# Patient Record
Sex: MALE | Race: BLACK OR AFRICAN AMERICAN | Employment: OTHER | URBAN - METROPOLITAN AREA
[De-identification: names, ages, dates, MRNs, and addresses within clinical notes are randomized per-mention and may not be internally consistent; named-entity substitution may affect disease eponyms.]

---

## 2017-10-14 LAB — PROSTATE SPECIFIC ANTIGEN: 1.81 NG/ML (ref 0–4)

## 2021-08-24 ENCOUNTER — ANESTHESIA (OUTPATIENT)
Dept: ENDOSCOPY | Age: 81
End: 2021-08-24
Payer: MEDICARE

## 2021-08-24 ENCOUNTER — ANESTHESIA EVENT (OUTPATIENT)
Dept: ENDOSCOPY | Age: 81
End: 2021-08-24
Payer: MEDICARE

## 2021-08-24 ENCOUNTER — HOSPITAL ENCOUNTER (OUTPATIENT)
Age: 81
Setting detail: OUTPATIENT SURGERY
Discharge: HOME | End: 2021-08-24
Attending: INTERNAL MEDICINE | Admitting: INTERNAL MEDICINE
Payer: MEDICARE

## 2021-08-24 VITALS
OXYGEN SATURATION: 96 % | RESPIRATION RATE: 18 BRPM | WEIGHT: 136 LBS | TEMPERATURE: 96.5 F | HEIGHT: 66 IN | HEART RATE: 82 BPM | DIASTOLIC BLOOD PRESSURE: 48 MMHG | SYSTOLIC BLOOD PRESSURE: 94 MMHG | BODY MASS INDEX: 21.86 KG/M2

## 2021-08-24 VITALS — SYSTOLIC BLOOD PRESSURE: 90 MMHG | OXYGEN SATURATION: 100 % | DIASTOLIC BLOOD PRESSURE: 51 MMHG

## 2021-08-24 LAB
GLUCOSE BLD-MCNC: 116 MG/DL (ref 70–99)
PERFORMED ON: ABNORMAL

## 2021-08-24 PROCEDURE — 88305 TISSUE EXAM BY PATHOLOGIST: CPT

## 2021-08-24 PROCEDURE — 6360000002 HC RX W HCPCS: Performed by: NURSE ANESTHETIST, CERTIFIED REGISTERED

## 2021-08-24 PROCEDURE — 2500000003 HC RX 250 WO HCPCS: Performed by: NURSE ANESTHETIST, CERTIFIED REGISTERED

## 2021-08-24 PROCEDURE — 3700000000 HC ANESTHESIA ATTENDED CARE: Performed by: INTERNAL MEDICINE

## 2021-08-24 PROCEDURE — 2580000003 HC RX 258: Performed by: INTERNAL MEDICINE

## 2021-08-24 PROCEDURE — 3609010600 HC COLONOSCOPY POLYPECTOMY SNARE/COLD BIOPSY: Performed by: INTERNAL MEDICINE

## 2021-08-24 PROCEDURE — 2709999900 HC NON-CHARGEABLE SUPPLY: Performed by: INTERNAL MEDICINE

## 2021-08-24 PROCEDURE — 7100000011 HC PHASE II RECOVERY - ADDTL 15 MIN: Performed by: INTERNAL MEDICINE

## 2021-08-24 PROCEDURE — 3700000001 HC ADD 15 MINUTES (ANESTHESIA): Performed by: INTERNAL MEDICINE

## 2021-08-24 PROCEDURE — 7100000010 HC PHASE II RECOVERY - FIRST 15 MIN: Performed by: INTERNAL MEDICINE

## 2021-08-24 RX ORDER — SODIUM CHLORIDE, SODIUM LACTATE, POTASSIUM CHLORIDE, CALCIUM CHLORIDE 600; 310; 30; 20 MG/100ML; MG/100ML; MG/100ML; MG/100ML
INJECTION, SOLUTION INTRAVENOUS CONTINUOUS
Status: DISCONTINUED | OUTPATIENT
Start: 2021-08-24 | End: 2021-08-24 | Stop reason: HOSPADM

## 2021-08-24 RX ORDER — SODIUM CHLORIDE 9 MG/ML
INJECTION, SOLUTION INTRAVENOUS CONTINUOUS
Status: DISCONTINUED | OUTPATIENT
Start: 2021-08-24 | End: 2021-08-24 | Stop reason: HOSPADM

## 2021-08-24 RX ORDER — ASCORBIC ACID 500 MG
500 TABLET ORAL
COMMUNITY

## 2021-08-24 RX ORDER — ATORVASTATIN CALCIUM 40 MG/1
1 TABLET, FILM COATED ORAL EVERY EVENING
COMMUNITY
Start: 2021-06-20

## 2021-08-24 RX ORDER — CLOTRIMAZOLE AND BETAMETHASONE DIPROPIONATE 10; .64 MG/G; MG/G
CREAM TOPICAL NIGHTLY
COMMUNITY
Start: 2021-06-16

## 2021-08-24 RX ORDER — PROPOFOL 10 MG/ML
INJECTION, EMULSION INTRAVENOUS CONTINUOUS PRN
Status: DISCONTINUED | OUTPATIENT
Start: 2021-08-24 | End: 2021-08-24 | Stop reason: SDUPTHER

## 2021-08-24 RX ORDER — DICYCLOMINE HCL 20 MG
1 TABLET ORAL 3 TIMES DAILY
COMMUNITY
Start: 2021-06-30

## 2021-08-24 RX ORDER — MIDODRINE HYDROCHLORIDE 5 MG/1
TABLET ORAL
COMMUNITY
Start: 2020-10-21

## 2021-08-24 RX ORDER — CHLORAL HYDRATE 500 MG
1 CAPSULE ORAL DAILY
Status: ON HOLD | COMMUNITY
Start: 2021-07-08 | End: 2021-08-24

## 2021-08-24 RX ORDER — LIDOCAINE HYDROCHLORIDE 20 MG/ML
INJECTION, SOLUTION INFILTRATION; PERINEURAL PRN
Status: DISCONTINUED | OUTPATIENT
Start: 2021-08-24 | End: 2021-08-24 | Stop reason: SDUPTHER

## 2021-08-24 RX ORDER — PSEUDOEPHEDRINE HCL 30 MG
100 TABLET ORAL NIGHTLY
COMMUNITY

## 2021-08-24 RX ORDER — OMEGA-3-ACID ETHYL ESTERS 1 G/1
4 CAPSULE, LIQUID FILLED ORAL DAILY
COMMUNITY

## 2021-08-24 RX ORDER — INSULIN GLARGINE 100 [IU]/ML
15 INJECTION, SOLUTION SUBCUTANEOUS EVERY EVENING
COMMUNITY
Start: 2021-07-04

## 2021-08-24 RX ORDER — LIDOCAINE AND PRILOCAINE 25; 25 MG/G; MG/G
CREAM TOPICAL
COMMUNITY

## 2021-08-24 RX ORDER — GUAIFENESIN 600 MG/1
1200 TABLET, EXTENDED RELEASE ORAL 2 TIMES DAILY PRN
COMMUNITY

## 2021-08-24 RX ORDER — ISOSORBIDE MONONITRATE 30 MG/1
1 TABLET, EXTENDED RELEASE ORAL EVERY OTHER DAY
COMMUNITY

## 2021-08-24 RX ORDER — INSULIN ASPART 100 [IU]/ML
INJECTION, SOLUTION INTRAVENOUS; SUBCUTANEOUS
COMMUNITY
Start: 2021-06-20

## 2021-08-24 RX ORDER — CLOPIDOGREL BISULFATE 75 MG/1
75 TABLET ORAL DAILY
COMMUNITY
Start: 2021-05-28

## 2021-08-24 RX ORDER — GABAPENTIN 100 MG/1
1 CAPSULE ORAL EVERY OTHER DAY
COMMUNITY
Start: 2021-08-16

## 2021-08-24 RX ORDER — PROPOFOL 10 MG/ML
INJECTION, EMULSION INTRAVENOUS PRN
Status: DISCONTINUED | OUTPATIENT
Start: 2021-08-24 | End: 2021-08-24 | Stop reason: SDUPTHER

## 2021-08-24 RX ORDER — ASPIRIN 81 MG/1
81 TABLET ORAL DAILY
COMMUNITY

## 2021-08-24 RX ORDER — ALBUTEROL SULFATE 90 UG/1
AEROSOL, METERED RESPIRATORY (INHALATION)
COMMUNITY
Start: 2021-07-20

## 2021-08-24 RX ORDER — GLYCOPYRROLATE 0.2 MG/ML
INJECTION INTRAMUSCULAR; INTRAVENOUS PRN
Status: DISCONTINUED | OUTPATIENT
Start: 2021-08-24 | End: 2021-08-24 | Stop reason: SDUPTHER

## 2021-08-24 RX ORDER — EPHEDRINE SULFATE 50 MG/ML
INJECTION INTRAVENOUS PRN
Status: DISCONTINUED | OUTPATIENT
Start: 2021-08-24 | End: 2021-08-24 | Stop reason: SDUPTHER

## 2021-08-24 RX ORDER — FEBUXOSTAT 40 MG/1
1 TABLET, FILM COATED ORAL DAILY
COMMUNITY
Start: 2021-08-16

## 2021-08-24 RX ADMIN — LIDOCAINE HYDROCHLORIDE 50 MG: 20 INJECTION, SOLUTION INFILTRATION; PERINEURAL at 13:12

## 2021-08-24 RX ADMIN — SODIUM CHLORIDE: 9 INJECTION, SOLUTION INTRAVENOUS at 12:40

## 2021-08-24 RX ADMIN — PHENYLEPHRINE HYDROCHLORIDE 160 MCG: 10 INJECTION, SOLUTION INTRAMUSCULAR; INTRAVENOUS; SUBCUTANEOUS at 13:34

## 2021-08-24 RX ADMIN — PHENYLEPHRINE HYDROCHLORIDE 80 MCG: 10 INJECTION, SOLUTION INTRAMUSCULAR; INTRAVENOUS; SUBCUTANEOUS at 13:22

## 2021-08-24 RX ADMIN — PROPOFOL 100 MCG/KG/MIN: 10 INJECTION, EMULSION INTRAVENOUS at 13:12

## 2021-08-24 RX ADMIN — EPHEDRINE SULFATE 10 MG: 50 INJECTION INTRAVENOUS at 13:53

## 2021-08-24 RX ADMIN — PHENYLEPHRINE HYDROCHLORIDE 240 MCG: 10 INJECTION, SOLUTION INTRAMUSCULAR; INTRAVENOUS; SUBCUTANEOUS at 13:38

## 2021-08-24 RX ADMIN — PHENYLEPHRINE HYDROCHLORIDE 160 MCG: 10 INJECTION, SOLUTION INTRAMUSCULAR; INTRAVENOUS; SUBCUTANEOUS at 13:28

## 2021-08-24 RX ADMIN — PHENYLEPHRINE HYDROCHLORIDE 240 MCG: 10 INJECTION, SOLUTION INTRAMUSCULAR; INTRAVENOUS; SUBCUTANEOUS at 13:44

## 2021-08-24 RX ADMIN — EPHEDRINE SULFATE 10 MG: 50 INJECTION INTRAVENOUS at 13:56

## 2021-08-24 RX ADMIN — SODIUM CHLORIDE: 9 INJECTION, SOLUTION INTRAVENOUS at 12:53

## 2021-08-24 RX ADMIN — PROPOFOL 70 MG: 10 INJECTION, EMULSION INTRAVENOUS at 13:12

## 2021-08-24 RX ADMIN — EPHEDRINE SULFATE 5 MG: 50 INJECTION INTRAVENOUS at 13:50

## 2021-08-24 RX ADMIN — GLYCOPYRROLATE 0.1 MG: 0.2 INJECTION, SOLUTION INTRAMUSCULAR; INTRAVENOUS at 13:24

## 2021-08-24 RX ADMIN — PHENYLEPHRINE HYDROCHLORIDE 80 MCG: 10 INJECTION, SOLUTION INTRAMUSCULAR; INTRAVENOUS; SUBCUTANEOUS at 13:18

## 2021-08-24 ASSESSMENT — PULMONARY FUNCTION TESTS
PIF_VALUE: 1
PIF_VALUE: 0
PIF_VALUE: 1
PIF_VALUE: 0
PIF_VALUE: 1
PIF_VALUE: 0
PIF_VALUE: 1
PIF_VALUE: 0
PIF_VALUE: 1

## 2021-08-24 ASSESSMENT — PAIN SCALES - GENERAL: PAINLEVEL_OUTOF10: 0

## 2021-08-24 ASSESSMENT — PAIN - FUNCTIONAL ASSESSMENT: PAIN_FUNCTIONAL_ASSESSMENT: 0-10

## 2021-08-24 ASSESSMENT — LIFESTYLE VARIABLES: SMOKING_STATUS: 0

## 2021-08-24 NOTE — ANESTHESIA POSTPROCEDURE EVALUATION
Department of Anesthesiology  Postprocedure Note    Patient: Sariah Hurtado  MRN: [de-identified]  YOB: 1940  Date of evaluation: 8/24/2021  Time:  4:12 PM     Procedure Summary     Date: 08/24/21 Room / Location: [facility]    Anesthesia Start: 0167 Anesthesia Stop: 1400    Procedure: COLONOSCOPY POLYPECTOMY SNARE/COLD BIOPSY (Left ) Diagnosis: (HX COLON POLYPS, FAMILY HX COLON CNACER)    Surgeons: Helga Savage MD Responsible Provider: Jennifer Mendoza MD    Anesthesia Type: general, MAC ASA Status: 3          Anesthesia Type: general, MAC    Yamilet Phase I: Yamilet Score: 10    Yamilet Phase II: Yamilet Score: 10    Last vitals: Reviewed and per EMR flowsheets.        Anesthesia Post Evaluation    Patient location during evaluation: PACU  Patient participation: complete - patient participated  Level of consciousness: awake  Pain score: 2  Airway patency: patent  Nausea & Vomiting: no nausea and no vomiting  Complications: no  Cardiovascular status: hemodynamically stable  Respiratory status: acceptable  Hydration status: euvolemic

## 2021-08-24 NOTE — ANESTHESIA PRE PROCEDURE
Department of Anesthesiology  Preprocedure Note       Name:  José Luis Shine   Age:  79 y.o.  :  1940                                          MRN:  [id number]         Date:  2021      Surgeon: José Miguel Castillo):  Nora Valadez MD    Procedure: Procedure(s):  COLONOSCOPY DIAGNOSTIC    Medications prior to admission:   Prior to Admission medications    Medication Sig Start Date End Date Taking? Authorizing Provider   DEXILANT 30 MG CPDR delayed release capsule Take 1 capsule by mouth daily 21  Yes Historical Provider, MD   febuxostat (ULORIC) 40 MG TABS tablet Take 1 tablet by mouth daily 21  Yes Historical Provider, MD   aspirin 81 MG EC tablet Take 81 mg by mouth daily   Yes Historical Provider, MD   clopidogrel (PLAVIX) 75 MG tablet Take 75 mg by mouth daily 21  Yes Historical Provider, MD   isosorbide mononitrate (IMDUR) 30 MG extended release tablet Take 1 tablet by mouth every other day Ofsths-Hkolaovrg-Xzhnfl   Yes Historical Provider, MD   midodrine (PROAMATINE) 5 MG tablet Take 10mg pre dialysis treatment for hypotension then take one tablet mid treatment,Take one tablet twice daily on non dialysis days. 10/21/20  Yes Historical Provider, MD   ascorbic acid (VITAMIN C) 500 MG tablet Take 500 mg by mouth Daily with supper   Yes Historical Provider, MD   Cholecalciferol 50 MCG (2000) CAPS Take 1 capsule by mouth Every Monday, Wednesday, and Friday   Yes Historical Provider, MD   atorvastatin (LIPITOR) 40 MG tablet Take 1 tablet by mouth every evening 21  Yes Historical Provider, MD   gabapentin (NEURONTIN) 100 MG capsule Take 1 capsule by mouth every other day.  Gnbifv-Cdbirjmxl-Kuixfg 8/16/21  Yes Historical Provider, MD Jennifer Liu 100 UNIT/ML injection pen Inject 15 Units into the skin every evening 21  Yes Historical Provider, MD   NOVOLOG FLEXPEN 100 UNIT/ML injection pen Sliding scale before meals 21  Yes Historical Provider, MD   dicyclomine (BENTYL) 20 MG tablet Take 1 tablet by mouth 3 times daily 6/30/21  Yes Historical Provider, MD   docusate (COLACE, DULCOLAX) 100 MG CAPS Take 100 mg by mouth nightly   Yes Historical Provider, MD   lidocaine-prilocaine (EMLA) 2.5-2.5 % cream Apply topically   Yes Historical Provider, MD   albuterol sulfate  (90 Base) MCG/ACT inhaler every 4-6 hours as needed 7/20/21  Yes Historical Provider, MD   clotrimazole-betamethasone (Richardson Schirmer) 1-0.05 % cream nightly 6/16/21  Yes Historical Provider, MD   omega-3 acid ethyl esters (LOVAZA) 1 g capsule Take 4 g by mouth daily   Yes Historical Provider, MD   B Complex-C-Folic Acid (DIALYVITE 614 PO) Take by mouth every evening   Yes Historical Provider, MD   guaiFENesin (MUCINEX) 600 MG extended release tablet Take 1,200 mg by mouth 2 times daily as needed    Historical Provider, MD       Current medications:    Current Facility-Administered Medications   Medication Dose Route Frequency Provider Last Rate Last Admin    lactated ringers infusion   IntraVENous Continuous Taras Jean MD        0.9 % sodium chloride infusion   IntraVENous Continuous Taras Jean  mL/hr at 08/24/21 1253 New Bag at 08/24/21 1253       Allergies:  No Known Allergies    Problem List:  There is no problem list on file for this patient.       Past Medical History:        Diagnosis Date    Asthma     Diabetes mellitus ([facility] [address]     Elevated cholesterol     GERD (gastroesophageal reflux disease)     Heart disease     Hemod[facility] patient [facility])        Past Surgical History:        Procedure Laterality Date    CARDIAC SURGERY  05/07/2012    CORONARY ANGIOPLASTY WITH STENT PLACEMENT  03/27/2015    DIALYSIS FISTULA CREATION Right     9/4/18; 12/13/18    HERNIA REPAIR Bilateral     right 2001/ left 1991    TOTAL KNEE ARTHROPLASTY Left 04/13/2012       Social History:    Social History     Tobacco Use    Smoking status: Never Smoker    Smokeless tobacco: Never Used   Substance Use Topics    Alcohol use: Never                                Counseling given: Not Answered      Vital Signs (Current):   Vitals:    08/24/21 1210   BP: 116/61   Pulse: 74   Resp: 18   Temp: 97.4 °F (36.3 °C)   TempSrc: Temporal   SpO2: 100%   Weight: 136 lb (61.7 kg)   Height: 5' 6\" (1.676 m)                                              BP Readings from Last 3 Encounters:   08/24/21 116/61       NPO Status: Time of last liquid consumption: 0830                        Time of last solid consumption: 0800                        Date of last liquid consumption: 08/24/21                        Date of last solid food consumption: 08/22/21    BMI:   Wt Readings from Last 3 Encounters:   08/24/21 136 lb (61.7 kg)     Body mass index is 21.95 kg/m².     CBC: No results found for: WBC, RBC, HGB, HCT, MCV, RDW, PLT    CMP: No results found for: NA, K, CL, CO2, BUN, CREATININE, GFRAA, AGRATIO, LABGLOM, GLUCOSE, PROT, CALCIUM, BILITOT, ALKPHOS, AST, ALT    POC Tests:   Recent Labs     08/24/21  1240   POCGLU 116*       Coags: No results found for: PROTIME, INR, APTT    HCG (If Applicable): No results found for: PREGTESTUR, PREGSERUM, HCG, HCGQUANT     ABGs: No results found for: PHART, PO2ART, CPI7NGG, JDJ8DMN, BEART, [id number]     Type & Screen (If Applicable):  No results found for: LABABO, LABRH    Drug/Infectious Status (If Applicable):  No results found for: HIV, HEPCAB    COVID-19 Screening (If Applicable): No results found for: COVID19        Anesthesia Evaluation    Airway: Mallampati: I  TM distance: >3 FB   Neck ROM: full  Mouth opening: > = 3 FB Dental:    (+) upper dentures, lower dentures and edentulous      Pulmonary: breath sounds clear to auscultation  (+) asthma (very controlled):     (-) sleep apnea and not a current smoker                           Cardiovascular:    (+) CABG/stent (2012 CABG then 2 stents 2015):, hyperlipidemia    (-) hypertension and  angina      Rhythm: regular  Rate: normal Neuro/Psych:      (-) seizures, TIA and CVA           GI/Hepatic/Renal:   (+) GERD:, renal disease (M,W,F HD did have it 8/24): dialysis and ESRD, bowel prep,           Endo/Other:    (+) Diabetes, no malignancy/cancer. (-) hyperthyroidism, no malignancy/cancer               Abdominal:             Vascular:     - DVT and PE. Other Findings:             Anesthesia Plan      general and MAC     ASA 3       Induction: intravenous. Anesthetic plan and risks discussed with patient. Plan discussed with CRNA.                   Curtis Hernandez MD   8/24/2021

## 2021-08-24 NOTE — H&P
Pre-operative History and Physical    Patient: Bigg Del Castillo  : 1940     History Obtained From:  patient    HISTORY OF PRESENT ILLNESS:    The patient is a 79 y.o. male who presents for a colonoscopy for FH colon cancer 2 brothers   Past Medical History:        Diagnosis Date    Asthma     Diabetes mellitus ([address]     Elevated cholesterol     GERD (gastroesophageal reflux disease)     Heart disease     Hemodialysis patient [facility])      Past Surgical History:        Procedure Laterality Date    CARDIAC SURGERY  2012    CORONARY ANGIOPLASTY WITH STENT PLACEMENT  2015    DIALYSIS FISTULA CREATION Right     18; 18    HERNIA REPAIR Bilateral     right / left     TOTAL KNEE ARTHROPLASTY Left 2012     Medications Prior to Admission:   No current facility-administered medications on file prior to encounter. Current Outpatient Medications on File Prior to Encounter   Medication Sig Dispense Refill    DEXILANT 30 MG CPDR delayed release capsule Take 1 capsule by mouth daily      febuxostat (ULORIC) 40 MG TABS tablet Take 1 tablet by mouth daily      aspirin 81 MG EC tablet Take 81 mg by mouth daily      clopidogrel (PLAVIX) 75 MG tablet Take 75 mg by mouth daily      isosorbide mononitrate (IMDUR) 30 MG extended release tablet Take 1 tablet by mouth every other day Kgfpsa-Udyrvfvfb-Qbaavh      midodrine (PROAMATINE) 5 MG tablet Take 10mg pre dialysis treatment for hypotension then take one tablet mid treatment,Take one tablet twice daily on non dialysis days.  ascorbic acid (VITAMIN C) 500 MG tablet Take 500 mg by mouth Daily with supper      Cholecalciferol 50 MCG (2000) CAPS Take 1 capsule by mouth Every Monday, Wednesday, and Friday      atorvastatin (LIPITOR) 40 MG tablet Take 1 tablet by mouth every evening      gabapentin (NEURONTIN) 100 MG capsule Take 1 capsule by mouth every other day.  [address] 100 UNIT/ML injection pen Inject 15 Units into the skin every evening      NOVOLOG FLEXPEN 100 UNIT/ML injection pen Sliding scale before meals      dicyclomine (BENTYL) 20 MG tablet Take 1 tablet by mouth 3 times daily      docusate (COLACE, DULCOLAX) 100 MG CAPS Take 100 mg by mouth nightly      lidocaine-prilocaine (EMLA) 2.5-2.5 % cream Apply topically      albuterol sulfate  (90 Base) MCG/ACT inhaler every 4-6 hours as needed      clotrimazole-betamethasone (LOTRISONE) 1-0.05 % cream nightly      omega-3 acid ethyl esters (LOVAZA) 1 g capsule Take 4 g by mouth daily      B Complex-C-Folic Acid (DIALYVITE 831 PO) Take by mouth every evening      guaiFENesin (MUCINEX) 600 MG extended release tablet Take 1,200 mg by mouth 2 times daily as needed       Allergies:  Patient has no known allergies. History of allergic reaction to anesthesia:  No        PHYSICAL EXAM:      /61   Pulse 74   Temp 97.4 °F (36.3 °C) (Temporal)   Resp 18   Ht 5' 6\" (1.676 m)   Wt 136 lb (61.7 kg)   SpO2 100%   BMI 21.95 kg/m²  I        Heart:  No m/r/g +s1/s2 RRR    Lungs:  CTA bilaterally    Abdomen:  Soft, nontender, non distended; +bs    ASA Grade:  ASA 3 - Patient with moderate systemic disease with functional limitations    Mallampati Class:2      ASSESSMENT AND PLAN:    1. Patient is a 79 y.o. male here for colonoscopy with deep sedation  2. Procedure options, risks and benefits reviewed with patient. We specifically discussed that risks include, but are not limited to infection, bleeding, perforation, death, and missed lesions. Patient expresses understanding.

## 2021-08-24 NOTE — PROGRESS NOTES
Last colonoscopy approximately 7 years. Hx of colon polyps. Family hx of colon cancer includes 2 brothers.

## 2021-08-24 NOTE — PROCEDURES
Colonoscopy REPORT    Patient:  [facility]                  1940    Endoscopist: NIKOLAS Santoyo     Indication:  FH colon cancer 2 brothers     Medications:  MAC    Pre-Anesthesia Assessment:  I have reviewed and am in agreement with patient history and medication, including previous response to sedation. Prior to the procedure, a History and Physical was performed, and patient medications and allergies were reviewed. The risks and benefits of the procedure and the sedation options and risks were discussed with the patient. Complications included but were not limited to infection, bleeding, perforation, death, and missed lesions. All questions were answered and informed consent was obtained by Dr Abby Downs. Patient identification and proposed procedure were verified by the physician and the nurse in the pre-procedure area and in the procedure room. Mallampatti: II  ASA Grade Assessment: 3    After reviewing the risks and benefits, the patient was deemed in satisfactory condition to undergo the procedure. The anesthesia plan was to use MAC sedation. Immediately prior to administration of medications, the patient was re-assessed for adequacy to receive sedatives and a time out was performed. Patient and healthcare providers were in agreement it was the correct patient and procedure. The heart rate, respiratory rate, oxygen saturations, blood pressure, adequacy of pulmonary ventilation, and response to care were monitored throughout the procedure. The physical status of the patient was re-assessed after the procedure. After adequate sedation was achieved in stepwise fashion a rectal examination was performed and showed no masses    The pediatric colonoscope was then advanced atraumatically into the rectum and advanced without difficulty to the cecum. The cecum was identified by the appendiceal orifice the ileocecal valve and other normal anatomy and a picture was obtained.       The scope was then withdrawn (>6minutes) with close inspection of the mucosa in a circumferential manor. TI normal for 8 cm. Mild colitis small area of ulceration in right and transverse, biopsied, non specific. 6 mm transverse polyp, cold snared. 15, 2-5 mm left polyps cold snared or biopsied and removed, multiple diminutive left polyps left behind. Retroflexed views of the rectum showed small internal hemorrhoids     No immediate complications. The preparation was good, excessive motility throughout despite Robinul. Estimated blood loss trace    Impression:  Mild colitis small area of ulceration in right and transverse, biopsied, non specific  6 mm transverse polyp, cold snared  15, 2-5 mm left polyps cold snared or biopsied and removed, multiple diminutive left polyps left behind   Small internal hemorrhoids     Plan:  The patient is aware it is their responsibility to call for biopsy results in 7 days.   Repeat colonoscopy pending path results  Resume Plavix in 2 days

## 2021-08-24 NOTE — PROGRESS NOTES
Dr. Fer Baker instructed to resume the aspirin tonight, Plavix on Thursday. Wife is a retired nurse, demonstrating understanding of NSAIDS and that it is okay to use Tylenol for pain management.

## (undated) DEVICE — FORCEPS BX L240CM JAW DIA2.8MM L CAP W/ NDL MIC MESH TOOTH

## (undated) DEVICE — CANNULA SAMP CO2 AD GRN 7FT CO2 AND 7FT O2 TBNG UNIV CONN

## (undated) DEVICE — TRAP SPEC RETRV CLR PLAS POLYP IN LN SUCT QUIK CTCH

## (undated) DEVICE — SNARES COLD OVAL 10MM THIN